# Patient Record
Sex: MALE | Race: WHITE | NOT HISPANIC OR LATINO | Employment: OTHER | URBAN - METROPOLITAN AREA
[De-identification: names, ages, dates, MRNs, and addresses within clinical notes are randomized per-mention and may not be internally consistent; named-entity substitution may affect disease eponyms.]

---

## 2023-11-30 PROBLEM — I25.10 CORONARY ARTERY DISEASE INVOLVING NATIVE CORONARY ARTERY OF NATIVE HEART WITHOUT ANGINA PECTORIS: Status: ACTIVE | Noted: 2023-11-30

## 2023-11-30 PROBLEM — Z95.5 PRESENCE OF STENT IN CORONARY ARTERY: Status: ACTIVE | Noted: 2023-11-30

## 2023-12-01 ENCOUNTER — OFFICE VISIT (OUTPATIENT)
Dept: CARDIOLOGY | Facility: CLINIC | Age: 68
End: 2023-12-01
Payer: MEDICARE

## 2023-12-01 VITALS
SYSTOLIC BLOOD PRESSURE: 142 MMHG | DIASTOLIC BLOOD PRESSURE: 88 MMHG | HEIGHT: 68 IN | BODY MASS INDEX: 32.1 KG/M2 | WEIGHT: 211.8 LBS

## 2023-12-01 DIAGNOSIS — Z95.5 PRESENCE OF STENT IN CORONARY ARTERY: ICD-10-CM

## 2023-12-01 DIAGNOSIS — I25.10 CORONARY ARTERY DISEASE INVOLVING NATIVE CORONARY ARTERY OF NATIVE HEART WITHOUT ANGINA PECTORIS: ICD-10-CM

## 2023-12-01 PROCEDURE — 93000 ELECTROCARDIOGRAM COMPLETE: CPT | Performed by: INTERNAL MEDICINE

## 2023-12-01 PROCEDURE — 1159F MED LIST DOCD IN RCRD: CPT | Performed by: INTERNAL MEDICINE

## 2023-12-01 PROCEDURE — 99214 OFFICE O/P EST MOD 30 MIN: CPT | Performed by: INTERNAL MEDICINE

## 2023-12-01 RX ORDER — CLOPIDOGREL BISULFATE 75 MG/1
75 TABLET ORAL DAILY
Qty: 90 TABLET | Refills: 3 | Status: SHIPPED | OUTPATIENT
Start: 2023-12-01 | End: 2024-11-30

## 2023-12-01 RX ORDER — METOPROLOL TARTRATE 25 MG/1
12.5 TABLET, FILM COATED ORAL 2 TIMES DAILY
Qty: 90 TABLET | Refills: 3 | Status: SHIPPED | OUTPATIENT
Start: 2023-12-01 | End: 2024-11-30

## 2023-12-01 RX ORDER — ATORVASTATIN CALCIUM 80 MG/1
80 TABLET, FILM COATED ORAL DAILY
Qty: 90 TABLET | Refills: 3 | Status: SHIPPED | OUTPATIENT
Start: 2023-12-01 | End: 2024-11-30

## 2023-12-01 RX ORDER — RIVAROXABAN 2.5 MG/1
2.5 TABLET, FILM COATED ORAL 2 TIMES DAILY
Qty: 60 TABLET | Refills: 11 | Status: SHIPPED | OUTPATIENT
Start: 2023-12-01 | End: 2024-01-08

## 2023-12-01 RX ORDER — ATORVASTATIN CALCIUM 80 MG/1
80 TABLET, FILM COATED ORAL DAILY
COMMUNITY
End: 2023-12-01 | Stop reason: SDUPTHER

## 2023-12-01 RX ORDER — METOPROLOL TARTRATE 25 MG/1
12.5 TABLET, FILM COATED ORAL 2 TIMES DAILY
COMMUNITY
End: 2023-12-01 | Stop reason: SDUPTHER

## 2023-12-01 RX ORDER — CLOPIDOGREL BISULFATE 75 MG/1
75 TABLET ORAL DAILY
COMMUNITY
End: 2023-12-01 | Stop reason: SDUPTHER

## 2023-12-01 RX ORDER — RIVAROXABAN 2.5 MG/1
2.5 TABLET, FILM COATED ORAL 2 TIMES DAILY
COMMUNITY
End: 2023-12-01 | Stop reason: SDUPTHER

## 2023-12-01 NOTE — PROGRESS NOTES
[facility] Cardiology Outpatient Follow-up Visit     Reason for Visit: CAD    HPI: Steph Del Real is a 68 y.o.  male who presents today for followup.     The patient's a 68-year-old male with a history of CAD who initially presented in November 2018 with a NSTEMI. At that time he was having chest discomfort and numbness of the lips. He was found to have elevated cardiac enzymes. He underwent cardiac catheterization with LILIAM of OM1 and OM2. He had some left main disease which progressed while we're working on the circumflex system. This necessitated LILIAM of the left main with IVUS guidance. He has residual moderate first diagonal and distal LAD disease. Echocardiogram demonstrated an ejection fraction of 55-60%. He presents today for follow-up. He denies any chest discomfort, shortness of breath, palpitations, syncope, orthopnea, PND, lower extremity edema. No limitations in his daily activity from a cardiac standpoint. Patient does admit to a rare blood-tinged sputum in the morning. This happens 1-2 times a week. This does not occur on a regular basis. This does not occur with teeth brushing.     Twelve-lead ECG demonstrates normal sinus rhythm, otherwise normal. 12/9/2022 ECG: Normal sinus rhythm, otherwise normal.     Past Medical History:   He has a past medical history of Old myocardial infarction, Personal history of other diseases of the circulatory system, Personal history of other diseases of the circulatory system (12/11/2018), and Personal history of other specified conditions.    Surgical History:   He has a past surgical history that includes Other surgical history (12/07/2018).    Family History:   No family history on file.    Allergies:  Patient has no known allergies.     Social History:    · No illicit drug use   · Non-smoker (V49.89) (Z78.9)   · Occasional alcohol use   · Patiient states very rarely uses..    Prior Cardiovascular Testing (Personally Reviewed):     Review of Systems:  Review of  "Systems   All other systems reviewed and are negative.      Outpatient Medications:    Current Outpatient Medications:     atorvastatin (Lipitor) 80 mg tablet, Take 1 tablet (80 mg) by mouth once daily., Disp: , Rfl:     clopidogrel (Plavix) 75 mg tablet, Take 1 tablet (75 mg) by mouth once daily., Disp: , Rfl:     metoprolol tartrate (Lopressor) 25 mg tablet, Take 0.5 tablets (12.5 mg) by mouth 2 times a day., Disp: , Rfl:     Xarelto 2.5 mg tablet, Take 1 tablet (2.5 mg) by mouth 2 times a day., Disp: , Rfl:      Last Recorded Vitals  /88 (BP Location: Left arm, Patient Position: Sitting, BP Cuff Size: Large adult)   Ht 1.727 m (5' 8\")   Wt 96.1 kg (211 lb 12.8 oz)   BMI 32.20 kg/m²     Physical Exam:    Physical Exam  Vitals reviewed.   Constitutional:       Appearance: Normal appearance.   HENT:      Head: Normocephalic and atraumatic.      Mouth/Throat:      Mouth: Mucous membranes are moist.      Pharynx: Oropharynx is clear.   Cardiovascular:      Rate and Rhythm: Normal rate and regular rhythm.      Pulses: Normal pulses.      Heart sounds: Normal heart sounds.   Pulmonary:      Effort: Pulmonary effort is normal.      Breath sounds: Normal breath sounds.   Abdominal:      General: Bowel sounds are normal.      Palpations: Abdomen is soft.   Musculoskeletal:      Cervical back: Neck supple.   Skin:     General: Skin is warm and dry.   Neurological:      General: No focal deficit present.      Mental Status: He is alert.   Psychiatric:         Mood and Affect: Mood normal.         Behavior: Behavior normal.         Lab/Radiology/Diagnostic Review:    Labs    Lab Results   Component Value Date    GLUCOSE 173 (H) 02/22/2019    CALCIUM 9.9 02/22/2019     02/22/2019    K 4.3 02/22/2019    CO2 23 02/22/2019     02/22/2019    BUN 17 02/22/2019    CREATININE 1.36 (H) 02/22/2019       Lab Results   Component Value Date    WBC 13.0 (H) 02/22/2019    HGB 15.7 02/22/2019    HCT 45.1 02/22/2019    " "MCV 91 02/22/2019     02/22/2019       Lab Results   Component Value Date    CHOL 225 (H) 11/15/2018     Lab Results   Component Value Date    HDL 37.4 (A) 11/15/2018     No results found for: \"LDLCALC\"  Lab Results   Component Value Date    TRIG 363 (H) 11/15/2018     No components found for: \"CHOLHDL\"    No results found for: \"BNP\"    No results found for: \"TSH\"    Assessment:   Patient is asymptomatic from a cardiac standpoint.     We'll continue Xarelto 2.5 mg twice a day and clopidogrel 75 mg a day. Patient will be on extended antiplatelet therapy given his stent in the left main coronary artery.      Patient should stay on beta blockade and statin therapy.    Will check a lipid panel.  Goal LDL is less than 70.     Patient can follow-up with me in one year or sooner if he has more problems.     Thank you for your visit today. Please contact our office with any questions.     Pb Haynes MD      "

## 2023-12-01 NOTE — LETTER
December 1, 2023       No Recipients    Patient: Steph Del Real   YOB: 1955   Date of Visit: 12/1/2023       Dear Dr. Sebastian Recipients:    Thank you for referring Steph Del Real to me for evaluation. Below are my notes for this consultation.  If you have questions, please do not hesitate to call me. I look forward to following your patient along with you.       Sincerely,     Pb Haynes MD      CC:   No Recipients  ______________________________________________________________________________________        [facility] Cardiology Outpatient Follow-up Visit     Reason for Visit: CAD    HPI: Steph Del Real is a 68 y.o.  male who presents today for followup.     The patient's a 68-year-old male with a history of CAD who initially presented in November 2018 with a NSTEMI. At that time he was having chest discomfort and numbness of the lips. He was found to have elevated cardiac enzymes. He underwent cardiac catheterization with LILIAM of OM1 and OM2. He had some left main disease which progressed while we're working on the circumflex system. This necessitated LILIAM of the left main with IVUS guidance. He has residual moderate first diagonal and distal LAD disease. Echocardiogram demonstrated an ejection fraction of 55-60%. He presents today for follow-up. He denies any chest discomfort, shortness of breath, palpitations, syncope, orthopnea, PND, lower extremity edema. No limitations in his daily activity from a cardiac standpoint. Patient does admit to a rare blood-tinged sputum in the morning. This happens 1-2 times a week. This does not occur on a regular basis. This does not occur with teeth brushing.     Twelve-lead ECG demonstrates normal sinus rhythm, otherwise normal. 12/9/2022 ECG: Normal sinus rhythm, otherwise normal.     Past Medical History:   He has a past medical history of Old myocardial infarction, Personal history of other diseases of the circulatory system, Personal history of other  "diseases of the circulatory system (12/11/2018), and Personal history of other specified conditions.    Surgical History:   He has a past surgical history that includes Other surgical history (12/07/2018).    Family History:   No family history on file.    Allergies:  Patient has no known allergies.     Social History:    · No illicit drug use   · Non-smoker (V49.89) (Z78.9)   · Occasional alcohol use   · Patiient states very rarely uses..    Prior Cardiovascular Testing (Personally Reviewed):     Review of Systems:  Review of Systems   All other systems reviewed and are negative.      Outpatient Medications:    Current Outpatient Medications:   •  atorvastatin (Lipitor) 80 mg tablet, Take 1 tablet (80 mg) by mouth once daily., Disp: , Rfl:   •  clopidogrel (Plavix) 75 mg tablet, Take 1 tablet (75 mg) by mouth once daily., Disp: , Rfl:   •  metoprolol tartrate (Lopressor) 25 mg tablet, Take 0.5 tablets (12.5 mg) by mouth 2 times a day., Disp: , Rfl:   •  Xarelto 2.5 mg tablet, Take 1 tablet (2.5 mg) by mouth 2 times a day., Disp: , Rfl:      Last Recorded Vitals  /88 (BP Location: Left arm, Patient Position: Sitting, BP Cuff Size: Large adult)   Ht 1.727 m (5' 8\")   Wt 96.1 kg (211 lb 12.8 oz)   BMI 32.20 kg/m²     Physical Exam:    Physical Exam  Vitals reviewed.   Constitutional:       Appearance: Normal appearance.   HENT:      Head: Normocephalic and atraumatic.      Mouth/Throat:      Mouth: Mucous membranes are moist.      Pharynx: Oropharynx is clear.   Cardiovascular:      Rate and Rhythm: Normal rate and regular rhythm.      Pulses: Normal pulses.      Heart sounds: Normal heart sounds.   Pulmonary:      Effort: Pulmonary effort is normal.      Breath sounds: Normal breath sounds.   Abdominal:      General: Bowel sounds are normal.      Palpations: Abdomen is soft.   Musculoskeletal:      Cervical back: Neck supple.   Skin:     General: Skin is warm and dry.   Neurological:      General: No focal " "deficit present.      Mental Status: He is alert.   Psychiatric:         Mood and Affect: Mood normal.         Behavior: Behavior normal.         Lab/Radiology/Diagnostic Review:    Labs    Lab Results   Component Value Date    GLUCOSE 173 (H) 02/22/2019    CALCIUM 9.9 02/22/2019     02/22/2019    K 4.3 02/22/2019    CO2 23 02/22/2019     02/22/2019    BUN 17 02/22/2019    CREATININE 1.36 (H) 02/22/2019       Lab Results   Component Value Date    WBC 13.0 (H) 02/22/2019    HGB 15.7 02/22/2019    HCT 45.1 02/22/2019    MCV 91 02/22/2019     02/22/2019       Lab Results   Component Value Date    CHOL 225 (H) 11/15/2018     Lab Results   Component Value Date    HDL 37.4 (A) 11/15/2018     No results found for: \"LDLCALC\"  Lab Results   Component Value Date    TRIG 363 (H) 11/15/2018     No components found for: \"CHOLHDL\"    No results found for: \"BNP\"    No results found for: \"TSH\"    Assessment:   Patient is asymptomatic from a cardiac standpoint.     We'll continue Xarelto 2.5 mg twice a day and clopidogrel 75 mg a day. Patient will be on extended antiplatelet therapy given his stent in the left main coronary artery.      Patient should stay on beta blockade and statin therapy.    Will check a lipid panel.  Goal LDL is less than 70.     Patient can follow-up with me in one year or sooner if he has more problems.     Thank you for your visit today. Please contact our office with any questions.     Pb Haynes MD    "

## 2024-01-08 DIAGNOSIS — Z95.5 PRESENCE OF STENT IN CORONARY ARTERY: ICD-10-CM

## 2024-01-08 DIAGNOSIS — I25.10 CORONARY ARTERY DISEASE INVOLVING NATIVE CORONARY ARTERY OF NATIVE HEART WITHOUT ANGINA PECTORIS: ICD-10-CM

## 2024-01-08 RX ORDER — RIVAROXABAN 2.5 MG/1
2.5 TABLET, FILM COATED ORAL 2 TIMES DAILY
Qty: 60 TABLET | Refills: 11 | Status: SHIPPED | OUTPATIENT
Start: 2024-01-08

## 2024-12-06 ENCOUNTER — APPOINTMENT (OUTPATIENT)
Dept: CARDIOLOGY | Facility: CLINIC | Age: 69
End: 2024-12-06
Payer: MEDICARE

## 2024-12-06 VITALS
DIASTOLIC BLOOD PRESSURE: 92 MMHG | WEIGHT: 209 LBS | SYSTOLIC BLOOD PRESSURE: 164 MMHG | HEART RATE: 74 BPM | HEIGHT: 68 IN | OXYGEN SATURATION: 96 % | BODY MASS INDEX: 31.67 KG/M2

## 2024-12-06 DIAGNOSIS — Z95.5 PRESENCE OF STENT IN CORONARY ARTERY: ICD-10-CM

## 2024-12-06 DIAGNOSIS — I25.10 CORONARY ARTERY DISEASE INVOLVING NATIVE CORONARY ARTERY OF NATIVE HEART WITHOUT ANGINA PECTORIS: ICD-10-CM

## 2024-12-06 DIAGNOSIS — I10 BENIGN ESSENTIAL HYPERTENSION: ICD-10-CM

## 2024-12-06 PROCEDURE — 93005 ELECTROCARDIOGRAM TRACING: CPT | Performed by: INTERNAL MEDICINE

## 2024-12-06 PROCEDURE — 3080F DIAST BP >= 90 MM HG: CPT | Performed by: INTERNAL MEDICINE

## 2024-12-06 PROCEDURE — 1159F MED LIST DOCD IN RCRD: CPT | Performed by: INTERNAL MEDICINE

## 2024-12-06 PROCEDURE — 99214 OFFICE O/P EST MOD 30 MIN: CPT | Performed by: INTERNAL MEDICINE

## 2024-12-06 PROCEDURE — 3008F BODY MASS INDEX DOCD: CPT | Performed by: INTERNAL MEDICINE

## 2024-12-06 PROCEDURE — 3077F SYST BP >= 140 MM HG: CPT | Performed by: INTERNAL MEDICINE

## 2024-12-06 RX ORDER — CARVEDILOL 3.12 MG/1
3.12 TABLET ORAL
Qty: 60 TABLET | Refills: 11 | Status: SHIPPED | OUTPATIENT
Start: 2024-12-06 | End: 2025-12-06

## 2024-12-06 NOTE — LETTER
December 6, 2024     No Recipients    Patient: Steph Del Real   YOB: 1955   Date of Visit: 12/6/2024       Dear Dr. Sebastian Recipients:    Thank you for referring Steph Del Real to me for evaluation. Below are my notes for this consultation.  If you have questions, please do not hesitate to call me. I look forward to following your patient along with you.       Sincerely,     Pb Haynes MD      CC: No Recipients  ______________________________________________________________________________________        [facility] Cardiology Outpatient Follow-up Visit     Reason for Visit: CAD     HPI: Steph Del Real is a 69 y.o.  male who presents today for followup.      The patient's a 69-year-old male with a history of CAD who initially presented in November 2018 with a NSTEMI. At that time he was having chest discomfort and numbness of the lips. He was found to have elevated cardiac enzymes. He underwent cardiac catheterization with LILIAM of OM1 and OM2. He had some left main disease which progressed while we're working on the circumflex system. This necessitated LILIAM of the left main with IVUS guidance. He has residual moderate first diagonal and distal LAD disease. Echocardiogram demonstrated an ejection fraction of 55-60%. He presents today for follow-up. He denies any chest discomfort, shortness of breath, palpitations, syncope, orthopnea, PND, lower extremity edema. No limitations in his daily activity from a cardiac standpoint.      Twelve-lead ECG demonstrates normal sinus rhythm, otherwise normal. 12/9/2022 ECG: Normal sinus rhythm, otherwise normal.  12/6/2024 ECG: Normal sinus rhythm, otherwise normal.    Past Medical History:   He has a past medical history of Old myocardial infarction, Personal history of other diseases of the circulatory system, Personal history of other diseases of the circulatory system (12/11/2018), and Personal history of other specified conditions.    Surgical History:   He has a  "past surgical history that includes Other surgical history (12/07/2018).    Family History:   No family history on file.    Allergies:  Patient has no known allergies.     Social History:    · No illicit drug use   · Non-smoker (V49.89) (Z78.9)   · Occasional alcohol use   · Patiient states very rarely uses..    Prior Cardiovascular Testing (Personally Reviewed):     Review of Systems:  Review of Systems   All other systems reviewed and are negative.      Outpatient Medications:    Current Outpatient Medications:   •  Xarelto 2.5 mg tablet, TAKE 1 TABLET BY MOUTH TWICE A DAY, Disp: 60 tablet, Rfl: 11  •  atorvastatin (Lipitor) 80 mg tablet, Take 1 tablet (80 mg) by mouth once daily., Disp: 90 tablet, Rfl: 3  •  metoprolol tartrate (Lopressor) 25 mg tablet, Take 0.5 tablets (12.5 mg) by mouth 2 times a day., Disp: 90 tablet, Rfl: 3     Last Recorded Vitals  BP (!) 164/92 (BP Location: Left arm, Patient Position: Sitting, BP Cuff Size: Adult)   Pulse 74   Ht 1.727 m (5' 8\")   Wt 94.8 kg (209 lb)   SpO2 96%   BMI 31.78 kg/m²     Physical Exam:    Physical Exam  Vitals reviewed.   Constitutional:       Appearance: Normal appearance.   HENT:      Head: Normocephalic and atraumatic.      Mouth/Throat:      Mouth: Mucous membranes are moist.      Pharynx: Oropharynx is clear.   Eyes:      Extraocular Movements: Extraocular movements intact.      Conjunctiva/sclera: Conjunctivae normal.   Cardiovascular:      Rate and Rhythm: Normal rate and regular rhythm.      Pulses: Normal pulses.      Heart sounds: Normal heart sounds.   Pulmonary:      Effort: Pulmonary effort is normal.      Breath sounds: Normal breath sounds.   Abdominal:      General: Bowel sounds are normal.      Palpations: Abdomen is soft.   Musculoskeletal:         General: No swelling.      Cervical back: Neck supple.   Skin:     General: Skin is warm and dry.   Neurological:      General: No focal deficit present.      Mental Status: He is alert. " "  Psychiatric:         Mood and Affect: Mood normal.         Behavior: Behavior normal.         Lab/Radiology/Diagnostic Review:    Labs    Lab Results   Component Value Date    GLUCOSE 173 (H) 02/22/2019    CALCIUM 9.9 02/22/2019     02/22/2019    K 4.3 02/22/2019    CO2 23 02/22/2019     02/22/2019    BUN 17 02/22/2019    CREATININE 1.36 (H) 02/22/2019       Lab Results   Component Value Date    WBC 13.0 (H) 02/22/2019    HGB 15.7 02/22/2019    HCT 45.1 02/22/2019    MCV 91 02/22/2019     02/22/2019       Lab Results   Component Value Date    CHOL 225 (H) 11/15/2018     Lab Results   Component Value Date    HDL 37.4 (A) 11/15/2018     No results found for: \"LDLCALC\"  Lab Results   Component Value Date    TRIG 363 (H) 11/15/2018     No components found for: \"CHOLHDL\"    No results found for: \"BNP\"    No results found for: \"TSH\"    Assessment:   Patient is asymptomatic from a cardiac standpoint.     We'll continue Xarelto 2.5 mg twice a day and clopidogrel 75 mg a day. Patient will be on extended antiplatelet therapy given his stent in the left main coronary artery.     Patient is hypertensive today.  Will go ahead and stop metoprolol start carvedilol 3.125 mg twice a day.  Will have him come in for blood pressure check in 1 week.  If he still hypertensive, would likely have to start an additional agent.     Patient should stay on statin therapy.     Goal LDL is less than 70.     Patient can follow-up with me in one year or sooner if he has more problems.     Pb Haynes MD    "

## 2024-12-06 NOTE — PROGRESS NOTES
[facility] Cardiology Outpatient Follow-up Visit     Reason for Visit: CAD     HPI: Steph Del Real is a 69 y.o.  male who presents today for followup.      The patient's a 69-year-old male with a history of CAD who initially presented in November 2018 with a NSTEMI. At that time he was having chest discomfort and numbness of the lips. He was found to have elevated cardiac enzymes. He underwent cardiac catheterization with LILIAM of OM1 and OM2. He had some left main disease which progressed while we're working on the circumflex system. This necessitated LILIAM of the left main with IVUS guidance. He has residual moderate first diagonal and distal LAD disease. Echocardiogram demonstrated an ejection fraction of 55-60%. He presents today for follow-up. He denies any chest discomfort, shortness of breath, palpitations, syncope, orthopnea, PND, lower extremity edema. No limitations in his daily activity from a cardiac standpoint.      Twelve-lead ECG demonstrates normal sinus rhythm, otherwise normal. 12/9/2022 ECG: Normal sinus rhythm, otherwise normal.  12/6/2024 ECG: Normal sinus rhythm, otherwise normal.    Past Medical History:   He has a past medical history of Old myocardial infarction, Personal history of other diseases of the circulatory system, Personal history of other diseases of the circulatory system (12/11/2018), and Personal history of other specified conditions.    Surgical History:   He has a past surgical history that includes Other surgical history (12/07/2018).    Family History:   No family history on file.    Allergies:  Patient has no known allergies.     Social History:    · No illicit drug use   · Non-smoker (V49.89) (Z78.9)   · Occasional alcohol use   · Patiient states very rarely uses..    Prior Cardiovascular Testing (Personally Reviewed):     Review of Systems:  Review of Systems   All other systems reviewed and are negative.      Outpatient Medications:    Current Outpatient Medications:      "Xarelto 2.5 mg tablet, TAKE 1 TABLET BY MOUTH TWICE A DAY, Disp: 60 tablet, Rfl: 11    atorvastatin (Lipitor) 80 mg tablet, Take 1 tablet (80 mg) by mouth once daily., Disp: 90 tablet, Rfl: 3    metoprolol tartrate (Lopressor) 25 mg tablet, Take 0.5 tablets (12.5 mg) by mouth 2 times a day., Disp: 90 tablet, Rfl: 3     Last Recorded Vitals  BP (!) 164/92 (BP Location: Left arm, Patient Position: Sitting, BP Cuff Size: Adult)   Pulse 74   Ht 1.727 m (5' 8\")   Wt 94.8 kg (209 lb)   SpO2 96%   BMI 31.78 kg/m²     Physical Exam:    Physical Exam  Vitals reviewed.   Constitutional:       Appearance: Normal appearance.   HENT:      Head: Normocephalic and atraumatic.      Mouth/Throat:      Mouth: Mucous membranes are moist.      Pharynx: Oropharynx is clear.   Eyes:      Extraocular Movements: Extraocular movements intact.      Conjunctiva/sclera: Conjunctivae normal.   Cardiovascular:      Rate and Rhythm: Normal rate and regular rhythm.      Pulses: Normal pulses.      Heart sounds: Normal heart sounds.   Pulmonary:      Effort: Pulmonary effort is normal.      Breath sounds: Normal breath sounds.   Abdominal:      General: Bowel sounds are normal.      Palpations: Abdomen is soft.   Musculoskeletal:         General: No swelling.      Cervical back: Neck supple.   Skin:     General: Skin is warm and dry.   Neurological:      General: No focal deficit present.      Mental Status: He is alert.   Psychiatric:         Mood and Affect: Mood normal.         Behavior: Behavior normal.         Lab/Radiology/Diagnostic Review:    Labs    Lab Results   Component Value Date    GLUCOSE 173 (H) 02/22/2019    CALCIUM 9.9 02/22/2019     02/22/2019    K 4.3 02/22/2019    CO2 23 02/22/2019     02/22/2019    BUN 17 02/22/2019    CREATININE 1.36 (H) 02/22/2019       Lab Results   Component Value Date    WBC 13.0 (H) 02/22/2019    HGB 15.7 02/22/2019    HCT 45.1 02/22/2019    MCV 91 02/22/2019     02/22/2019 " "      Lab Results   Component Value Date    CHOL 225 (H) 11/15/2018     Lab Results   Component Value Date    HDL 37.4 (A) 11/15/2018     No results found for: \"LDLCALC\"  Lab Results   Component Value Date    TRIG 363 (H) 11/15/2018     No components found for: \"CHOLHDL\"    No results found for: \"BNP\"    No results found for: \"TSH\"    Assessment:   Patient is asymptomatic from a cardiac standpoint.     We'll continue Xarelto 2.5 mg twice a day and clopidogrel 75 mg a day. Patient will be on extended antiplatelet therapy given his stent in the left main coronary artery.     Patient is hypertensive today.  Will go ahead and stop metoprolol start carvedilol 3.125 mg twice a day.  Will have him come in for blood pressure check in 1 week.  If he still hypertensive, would likely have to start an additional agent.     Patient should stay on statin therapy.     Goal LDL is less than 70.     Patient can follow-up with me in one year or sooner if he has more problems.     Pb Haynes MD      "

## 2024-12-06 NOTE — LETTER
December 6, 2024     Valentín Du MD  [address] [facility], [address]  [facility] [address]    Patient: Steph Del Real   YOB: 1955   Date of Visit: 12/6/2024       Dear Dr. Valentín Du MD:    Thank you for referring Steph Del Real to me for evaluation. Below are my notes for this consultation.  If you have questions, please do not hesitate to call me. I look forward to following your patient along with you.       Sincerely,     Pb Haynes MD      CC: No Recipients  ______________________________________________________________________________________        [facility] Cardiology Outpatient Follow-up Visit     Reason for Visit: CAD     HPI: Steph Del Real is a 69 y.o.  male who presents today for followup.      The patient's a 69-year-old male with a history of CAD who initially presented in November 2018 with a NSTEMI. At that time he was having chest discomfort and numbness of the lips. He was found to have elevated cardiac enzymes. He underwent cardiac catheterization with LILIAM of OM1 and OM2. He had some left main disease which progressed while we're working on the circumflex system. This necessitated LILIAM of the left main with IVUS guidance. He has residual moderate first diagonal and distal LAD disease. Echocardiogram demonstrated an ejection fraction of 55-60%. He presents today for follow-up. He denies any chest discomfort, shortness of breath, palpitations, syncope, orthopnea, PND, lower extremity edema. No limitations in his daily activity from a cardiac standpoint.      Twelve-lead ECG demonstrates normal sinus rhythm, otherwise normal. 12/9/2022 ECG: Normal sinus rhythm, otherwise normal.  12/6/2024 ECG: Normal sinus rhythm, otherwise normal.    Past Medical History:   He has a past medical history of Old myocardial infarction, Personal history of other diseases of the circulatory system, Personal history of other diseases of the circulatory system  "(12/11/2018), and Personal history of other specified conditions.    Surgical History:   He has a past surgical history that includes Other surgical history (12/07/2018).    Family History:   No family history on file.    Allergies:  Patient has no known allergies.     Social History:    · No illicit drug use   · Non-smoker (V49.89) (Z78.9)   · Occasional alcohol use   · Patiient states very rarely uses..    Prior Cardiovascular Testing (Personally Reviewed):     Review of Systems:  Review of Systems   All other systems reviewed and are negative.      Outpatient Medications:    Current Outpatient Medications:   •  Xarelto 2.5 mg tablet, TAKE 1 TABLET BY MOUTH TWICE A DAY, Disp: 60 tablet, Rfl: 11  •  atorvastatin (Lipitor) 80 mg tablet, Take 1 tablet (80 mg) by mouth once daily., Disp: 90 tablet, Rfl: 3  •  metoprolol tartrate (Lopressor) 25 mg tablet, Take 0.5 tablets (12.5 mg) by mouth 2 times a day., Disp: 90 tablet, Rfl: 3     Last Recorded Vitals  BP (!) 164/92 (BP Location: Left arm, Patient Position: Sitting, BP Cuff Size: Adult)   Pulse 74   Ht 1.727 m (5' 8\")   Wt 94.8 kg (209 lb)   SpO2 96%   BMI 31.78 kg/m²     Physical Exam:    Physical Exam  Vitals reviewed.   Constitutional:       Appearance: Normal appearance.   HENT:      Head: Normocephalic and atraumatic.      Mouth/Throat:      Mouth: Mucous membranes are moist.      Pharynx: Oropharynx is clear.   Eyes:      Extraocular Movements: Extraocular movements intact.      Conjunctiva/sclera: Conjunctivae normal.   Cardiovascular:      Rate and Rhythm: Normal rate and regular rhythm.      Pulses: Normal pulses.      Heart sounds: Normal heart sounds.   Pulmonary:      Effort: Pulmonary effort is normal.      Breath sounds: Normal breath sounds.   Abdominal:      General: Bowel sounds are normal.      Palpations: Abdomen is soft.   Musculoskeletal:         General: No swelling.      Cervical back: Neck supple.   Skin:     General: Skin is warm and " "dry.   Neurological:      General: No focal deficit present.      Mental Status: He is alert.   Psychiatric:         Mood and Affect: Mood normal.         Behavior: Behavior normal.         Lab/Radiology/Diagnostic Review:    Labs    Lab Results   Component Value Date    GLUCOSE 173 (H) 02/22/2019    CALCIUM 9.9 02/22/2019     02/22/2019    K 4.3 02/22/2019    CO2 23 02/22/2019     02/22/2019    BUN 17 02/22/2019    CREATININE 1.36 (H) 02/22/2019       Lab Results   Component Value Date    WBC 13.0 (H) 02/22/2019    HGB 15.7 02/22/2019    HCT 45.1 02/22/2019    MCV 91 02/22/2019     02/22/2019       Lab Results   Component Value Date    CHOL 225 (H) 11/15/2018     Lab Results   Component Value Date    HDL 37.4 (A) 11/15/2018     No results found for: \"LDLCALC\"  Lab Results   Component Value Date    TRIG 363 (H) 11/15/2018     No components found for: \"CHOLHDL\"    No results found for: \"BNP\"    No results found for: \"TSH\"    Assessment:   Patient is asymptomatic from a cardiac standpoint.     We'll continue Xarelto 2.5 mg twice a day and clopidogrel 75 mg a day. Patient will be on extended antiplatelet therapy given his stent in the left main coronary artery.     Patient is hypertensive today.  Will go ahead and stop metoprolol start carvedilol 3.125 mg twice a day.  Will have him come in for blood pressure check in 1 week.  If he still hypertensive, would likely have to start an additional agent.     Patient should stay on statin therapy.     Goal LDL is less than 70.     Patient can follow-up with me in one year or sooner if he has more problems.     Pb Haynes MD      "

## 2024-12-13 ENCOUNTER — CLINICAL SUPPORT (OUTPATIENT)
Dept: CARDIOLOGY | Facility: CLINIC | Age: 69
End: 2024-12-13
Payer: MEDICARE

## 2024-12-13 VITALS — SYSTOLIC BLOOD PRESSURE: 122 MMHG | HEART RATE: 82 BPM | OXYGEN SATURATION: 95 % | DIASTOLIC BLOOD PRESSURE: 78 MMHG

## 2024-12-13 DIAGNOSIS — I10 BENIGN ESSENTIAL HYPERTENSION: ICD-10-CM

## 2024-12-13 NOTE — PROGRESS NOTES
Patient arrives ambulatory from home for bp check. Recent OV with Provider and patient found to be hypertensive. Medication changes made: metoprolol dc'd, carvedilol 3.125mg bid initiated.  Patient stated he is tolerating new medication without adverse effects.  Instructed on bp readings today, within normal range and to continue current medications. Will review with Dr. Haynes and call Monday if any changes to plan. Patient verb understanding.

## 2024-12-17 LAB
ATRIAL RATE: 71 BPM
P AXIS: 43 DEGREES
P OFFSET: 204 MS
P ONSET: 149 MS
PR INTERVAL: 142 MS
Q ONSET: 220 MS
QRS COUNT: 12 BEATS
QRS DURATION: 92 MS
QT INTERVAL: 392 MS
QTC CALCULATION(BAZETT): 425 MS
QTC FREDERICIA: 414 MS
R AXIS: 31 DEGREES
T AXIS: 29 DEGREES
T OFFSET: 416 MS
VENTRICULAR RATE: 71 BPM